# Patient Record
Sex: FEMALE | Race: WHITE | NOT HISPANIC OR LATINO | Employment: PART TIME | URBAN - METROPOLITAN AREA
[De-identification: names, ages, dates, MRNs, and addresses within clinical notes are randomized per-mention and may not be internally consistent; named-entity substitution may affect disease eponyms.]

---

## 2023-02-23 LAB
ALANINE AMINOTRANSFERASE (SGPT) (U/L) IN SER/PLAS: 27 U/L (ref 7–45)
ALBUMIN (G/DL) IN SER/PLAS: 4.3 G/DL (ref 3.4–5)
ALKALINE PHOSPHATASE (U/L) IN SER/PLAS: 83 U/L (ref 33–136)
ANION GAP IN SER/PLAS: 12 MMOL/L (ref 10–20)
ASPARTATE AMINOTRANSFERASE (SGOT) (U/L) IN SER/PLAS: 19 U/L (ref 9–39)
BASOPHILS (10*3/UL) IN BLOOD BY AUTOMATED COUNT: 0.09 X10E9/L (ref 0–0.1)
BASOPHILS/100 LEUKOCYTES IN BLOOD BY AUTOMATED COUNT: 0.9 % (ref 0–2)
BILIRUBIN TOTAL (MG/DL) IN SER/PLAS: 0.2 MG/DL (ref 0–1.2)
CALCIUM (MG/DL) IN SER/PLAS: 9.3 MG/DL (ref 8.6–10.3)
CARBON DIOXIDE, TOTAL (MMOL/L) IN SER/PLAS: 27 MMOL/L (ref 21–32)
CHLORIDE (MMOL/L) IN SER/PLAS: 105 MMOL/L (ref 98–107)
CHOLESTEROL (MG/DL) IN SER/PLAS: 94 MG/DL (ref 0–199)
CHOLESTEROL IN HDL (MG/DL) IN SER/PLAS: 35.4 MG/DL
CHOLESTEROL/HDL RATIO: 2.7
CREATININE (MG/DL) IN SER/PLAS: 0.92 MG/DL (ref 0.5–1.05)
EOSINOPHILS (10*3/UL) IN BLOOD BY AUTOMATED COUNT: 0.27 X10E9/L (ref 0–0.7)
EOSINOPHILS/100 LEUKOCYTES IN BLOOD BY AUTOMATED COUNT: 2.8 % (ref 0–6)
ERYTHROCYTE DISTRIBUTION WIDTH (RATIO) BY AUTOMATED COUNT: 13.6 % (ref 11.5–14.5)
ERYTHROCYTE MEAN CORPUSCULAR HEMOGLOBIN CONCENTRATION (G/DL) BY AUTOMATED: 31.5 G/DL (ref 32–36)
ERYTHROCYTE MEAN CORPUSCULAR VOLUME (FL) BY AUTOMATED COUNT: 89 FL (ref 80–100)
ERYTHROCYTES (10*6/UL) IN BLOOD BY AUTOMATED COUNT: 4.49 X10E12/L (ref 4–5.2)
ESTIMATED AVERAGE GLUCOSE FOR HBA1C: 160 MG/DL
GFR FEMALE: 67 ML/MIN/1.73M2
GLUCOSE (MG/DL) IN SER/PLAS: 135 MG/DL (ref 74–99)
HEMATOCRIT (%) IN BLOOD BY AUTOMATED COUNT: 40 % (ref 36–46)
HEMOGLOBIN (G/DL) IN BLOOD: 12.6 G/DL (ref 12–16)
HEMOGLOBIN A1C/HEMOGLOBIN TOTAL IN BLOOD: 7.2 %
IMMATURE GRANULOCYTES/100 LEUKOCYTES IN BLOOD BY AUTOMATED COUNT: 0.3 % (ref 0–0.9)
LDL: 21 MG/DL (ref 0–99)
LEUKOCYTES (10*3/UL) IN BLOOD BY AUTOMATED COUNT: 9.7 X10E9/L (ref 4.4–11.3)
LYMPHOCYTES (10*3/UL) IN BLOOD BY AUTOMATED COUNT: 3.97 X10E9/L (ref 1.2–4.8)
LYMPHOCYTES/100 LEUKOCYTES IN BLOOD BY AUTOMATED COUNT: 40.8 % (ref 13–44)
MONOCYTES (10*3/UL) IN BLOOD BY AUTOMATED COUNT: 0.66 X10E9/L (ref 0.1–1)
MONOCYTES/100 LEUKOCYTES IN BLOOD BY AUTOMATED COUNT: 6.8 % (ref 2–10)
NEUTROPHILS (10*3/UL) IN BLOOD BY AUTOMATED COUNT: 4.71 X10E9/L (ref 1.2–7.7)
NEUTROPHILS/100 LEUKOCYTES IN BLOOD BY AUTOMATED COUNT: 48.4 % (ref 40–80)
PLATELETS (10*3/UL) IN BLOOD AUTOMATED COUNT: 271 X10E9/L (ref 150–450)
POTASSIUM (MMOL/L) IN SER/PLAS: 4.7 MMOL/L (ref 3.5–5.3)
PROTEIN TOTAL: 6.7 G/DL (ref 6.4–8.2)
SODIUM (MMOL/L) IN SER/PLAS: 139 MMOL/L (ref 136–145)
TRIGLYCERIDE (MG/DL) IN SER/PLAS: 187 MG/DL (ref 0–149)
UREA NITROGEN (MG/DL) IN SER/PLAS: 19 MG/DL (ref 6–23)
VLDL: 37 MG/DL (ref 0–40)

## 2023-05-04 DIAGNOSIS — I10 PRIMARY HYPERTENSION: Primary | ICD-10-CM

## 2023-05-04 DIAGNOSIS — E11.65 TYPE 2 DIABETES MELLITUS WITH HYPERGLYCEMIA, WITHOUT LONG-TERM CURRENT USE OF INSULIN (MULTI): ICD-10-CM

## 2023-05-04 RX ORDER — LISINOPRIL 10 MG/1
TABLET ORAL
Qty: 100 TABLET | Refills: 2 | Status: SHIPPED | OUTPATIENT
Start: 2023-05-04 | End: 2023-08-21 | Stop reason: SDUPTHER

## 2023-05-04 RX ORDER — METOPROLOL SUCCINATE 50 MG/1
TABLET, EXTENDED RELEASE ORAL
Qty: 100 TABLET | Refills: 2 | Status: SHIPPED | OUTPATIENT
Start: 2023-05-04 | End: 2023-08-21 | Stop reason: SDUPTHER

## 2023-05-04 RX ORDER — METFORMIN HYDROCHLORIDE 1000 MG/1
TABLET ORAL
Qty: 200 TABLET | Refills: 2 | Status: SHIPPED | OUTPATIENT
Start: 2023-05-04 | End: 2023-08-21 | Stop reason: SDUPTHER

## 2023-05-11 DIAGNOSIS — E78.2 COMBINED HYPERLIPIDEMIA: Primary | ICD-10-CM

## 2023-05-12 RX ORDER — ROSUVASTATIN CALCIUM 10 MG/1
TABLET, COATED ORAL
Qty: 100 TABLET | Refills: 2 | Status: SHIPPED | OUTPATIENT
Start: 2023-05-12 | End: 2024-02-26 | Stop reason: SDUPTHER

## 2023-07-26 DIAGNOSIS — F32.0 DEPRESSION, MAJOR, SINGLE EPISODE, MILD (CMS-HCC): Primary | ICD-10-CM

## 2023-07-26 PROBLEM — F17.200 NICOTINE DEPENDENCE: Status: ACTIVE | Noted: 2023-07-26

## 2023-07-26 PROBLEM — E78.5 HYPERLIPIDEMIA: Status: ACTIVE | Noted: 2023-07-26

## 2023-07-26 PROBLEM — E11.9 TYPE 2 DIABETES MELLITUS (MULTI): Status: ACTIVE | Noted: 2023-07-26

## 2023-07-26 PROBLEM — I10 BENIGN ESSENTIAL HYPERTENSION: Status: ACTIVE | Noted: 2023-07-26

## 2023-07-26 RX ORDER — ESCITALOPRAM OXALATE 10 MG/1
10 TABLET ORAL DAILY
Qty: 90 TABLET | Refills: 3 | Status: SHIPPED | OUTPATIENT
Start: 2023-07-26 | End: 2024-05-20

## 2023-07-26 RX ORDER — ESCITALOPRAM OXALATE 10 MG/1
1 TABLET ORAL DAILY
COMMUNITY
Start: 2014-12-09 | End: 2023-08-21 | Stop reason: SDUPTHER

## 2023-07-26 RX ORDER — BLOOD SUGAR DIAGNOSTIC
STRIP MISCELLANEOUS
COMMUNITY
Start: 2020-05-01 | End: 2023-08-02

## 2023-08-02 DIAGNOSIS — E11.65 TYPE 2 DIABETES MELLITUS WITH HYPERGLYCEMIA, WITH LONG-TERM CURRENT USE OF INSULIN (MULTI): Primary | ICD-10-CM

## 2023-08-02 DIAGNOSIS — Z79.4 TYPE 2 DIABETES MELLITUS WITH HYPERGLYCEMIA, WITH LONG-TERM CURRENT USE OF INSULIN (MULTI): Primary | ICD-10-CM

## 2023-08-02 RX ORDER — BLOOD SUGAR DIAGNOSTIC
STRIP MISCELLANEOUS
Qty: 100 STRIP | Refills: 2 | Status: SHIPPED | OUTPATIENT
Start: 2023-08-02

## 2023-08-21 ENCOUNTER — OFFICE VISIT (OUTPATIENT)
Dept: PRIMARY CARE | Facility: CLINIC | Age: 73
End: 2023-08-21
Payer: MEDICARE

## 2023-08-21 VITALS
HEIGHT: 61 IN | BODY MASS INDEX: 34.17 KG/M2 | TEMPERATURE: 98 F | WEIGHT: 181 LBS | DIASTOLIC BLOOD PRESSURE: 62 MMHG | SYSTOLIC BLOOD PRESSURE: 120 MMHG

## 2023-08-21 DIAGNOSIS — F32.0 DEPRESSION, MAJOR, SINGLE EPISODE, MILD (CMS-HCC): ICD-10-CM

## 2023-08-21 DIAGNOSIS — E78.2 MIXED HYPERLIPIDEMIA: ICD-10-CM

## 2023-08-21 DIAGNOSIS — E11.9 TYPE 2 DIABETES MELLITUS WITHOUT COMPLICATION, UNSPECIFIED WHETHER LONG TERM INSULIN USE (MULTI): Primary | ICD-10-CM

## 2023-08-21 DIAGNOSIS — I10 PRIMARY HYPERTENSION: ICD-10-CM

## 2023-08-21 DIAGNOSIS — E11.65 TYPE 2 DIABETES MELLITUS WITH HYPERGLYCEMIA, WITHOUT LONG-TERM CURRENT USE OF INSULIN (MULTI): ICD-10-CM

## 2023-08-21 LAB — POC HEMOGLOBIN A1C: 6.4 % (ref 4.2–6.5)

## 2023-08-21 PROCEDURE — 3078F DIAST BP <80 MM HG: CPT | Performed by: NURSE PRACTITIONER

## 2023-08-21 PROCEDURE — 3051F HG A1C>EQUAL 7.0%<8.0%: CPT | Performed by: NURSE PRACTITIONER

## 2023-08-21 PROCEDURE — 99214 OFFICE O/P EST MOD 30 MIN: CPT | Performed by: NURSE PRACTITIONER

## 2023-08-21 PROCEDURE — 3074F SYST BP LT 130 MM HG: CPT | Performed by: NURSE PRACTITIONER

## 2023-08-21 PROCEDURE — 83036 HEMOGLOBIN GLYCOSYLATED A1C: CPT | Performed by: NURSE PRACTITIONER

## 2023-08-21 PROCEDURE — 1160F RVW MEDS BY RX/DR IN RCRD: CPT | Performed by: NURSE PRACTITIONER

## 2023-08-21 PROCEDURE — 4010F ACE/ARB THERAPY RXD/TAKEN: CPT | Performed by: NURSE PRACTITIONER

## 2023-08-21 PROCEDURE — 1159F MED LIST DOCD IN RCRD: CPT | Performed by: NURSE PRACTITIONER

## 2023-08-21 RX ORDER — METFORMIN HYDROCHLORIDE 1000 MG/1
1000 TABLET ORAL
Qty: 200 TABLET | Refills: 1 | Status: SHIPPED | OUTPATIENT
Start: 2023-08-21 | End: 2024-02-26 | Stop reason: SDUPTHER

## 2023-08-21 RX ORDER — LISINOPRIL 10 MG/1
10 TABLET ORAL DAILY
Qty: 100 TABLET | Refills: 1 | Status: SHIPPED | OUTPATIENT
Start: 2023-08-21 | End: 2024-02-26 | Stop reason: SDUPTHER

## 2023-08-21 RX ORDER — METOPROLOL SUCCINATE 50 MG/1
50 TABLET, EXTENDED RELEASE ORAL DAILY
Qty: 100 TABLET | Refills: 1 | Status: SHIPPED | OUTPATIENT
Start: 2023-08-21 | End: 2024-02-26 | Stop reason: SDUPTHER

## 2023-08-21 ASSESSMENT — ENCOUNTER SYMPTOMS
CARDIOVASCULAR NEGATIVE: 1
NEUROLOGICAL NEGATIVE: 1
PSYCHIATRIC NEGATIVE: 1
RESPIRATORY NEGATIVE: 1
CONSTITUTIONAL NEGATIVE: 1
MUSCULOSKELETAL NEGATIVE: 1

## 2023-08-21 ASSESSMENT — PATIENT HEALTH QUESTIONNAIRE - PHQ9
SUM OF ALL RESPONSES TO PHQ9 QUESTIONS 1 AND 2: 0
2. FEELING DOWN, DEPRESSED OR HOPELESS: NOT AT ALL
1. LITTLE INTEREST OR PLEASURE IN DOING THINGS: NOT AT ALL

## 2023-08-21 NOTE — PROGRESS NOTES
"Subjective   Patient ID: Aniya Ha is a 72 y.o. female who presents for Follow-up (6 month).    HPI Presents today for follow up and medication renewal.  Tolerates medication well.  Went off statin for two weeks because she was having some muscle aches  Now back on and neck pain went away  Has not come back  Esctipalopram depression under good control  Working two jobs,.  5 days a week at [employer].  3 evening at [employer]  Is more active and feeling well  Blood work 2/2023 reviewed as noted    Review of Systems   Constitutional: Negative.    Respiratory: Negative.     Cardiovascular: Negative.    Musculoskeletal: Negative.    Neurological: Negative.    Psychiatric/Behavioral: Negative.         Objective   /62 (BP Location: Left arm, Patient Position: Sitting)   Temp 36.7 °C (98 °F) (Temporal)   Ht 1.56 m (5' 1.42\")   Wt 82.1 kg (181 lb)   BMI 33.74 kg/m²     Physical Exam  Constitutional:       Appearance: Normal appearance.   Cardiovascular:      Rate and Rhythm: Normal rate and regular rhythm.   Pulmonary:      Effort: Pulmonary effort is normal.      Breath sounds: Normal breath sounds.   Abdominal:      General: Abdomen is flat. Bowel sounds are normal.      Palpations: Abdomen is soft.   Musculoskeletal:         General: Normal range of motion.   Neurological:      General: No focal deficit present.      Mental Status: She is alert.   Psychiatric:         Mood and Affect: Mood normal.         Behavior: Behavior normal.         Assessment/Plan   Problem List Items Addressed This Visit       Depression, major, single episode, mild (CMS/HCC)    Type 2 diabetes mellitus (CMS/HCC)    Relevant Medications    metFORMIN (Glucophage) 1,000 mg tablet    Other Relevant Orders    POCT glycosylated hemoglobin (Hb A1C) manually resulted (Completed)     Other Visit Diagnoses       Primary hypertension        Relevant Medications    metoprolol succinate XL (Toprol-XL) 50 mg 24 hr tablet    lisinopril 10 mg " tablet

## 2023-08-21 NOTE — PATIENT INSTRUCTIONS
Keep up the good work with the exercise.   Medications renewed  Follow up in 6 months and as needed

## 2024-02-21 ENCOUNTER — OFFICE VISIT (OUTPATIENT)
Dept: PRIMARY CARE | Facility: CLINIC | Age: 74
End: 2024-02-21
Payer: MEDICARE

## 2024-02-21 VITALS
HEIGHT: 61 IN | SYSTOLIC BLOOD PRESSURE: 130 MMHG | HEART RATE: 96 BPM | WEIGHT: 180 LBS | TEMPERATURE: 97.6 F | OXYGEN SATURATION: 97 % | BODY MASS INDEX: 33.99 KG/M2 | DIASTOLIC BLOOD PRESSURE: 74 MMHG

## 2024-02-21 DIAGNOSIS — Z00.00 MEDICARE ANNUAL WELLNESS VISIT, SUBSEQUENT: Primary | ICD-10-CM

## 2024-02-21 DIAGNOSIS — E78.2 MIXED HYPERLIPIDEMIA: ICD-10-CM

## 2024-02-21 DIAGNOSIS — I10 BENIGN ESSENTIAL HYPERTENSION: ICD-10-CM

## 2024-02-21 DIAGNOSIS — F17.210 CIGARETTE NICOTINE DEPENDENCE WITHOUT COMPLICATION: ICD-10-CM

## 2024-02-21 DIAGNOSIS — Z00.00 HEALTHCARE MAINTENANCE: ICD-10-CM

## 2024-02-21 DIAGNOSIS — F32.0 DEPRESSION, MAJOR, SINGLE EPISODE, MILD (CMS-HCC): ICD-10-CM

## 2024-02-21 DIAGNOSIS — Z12.11 ENCOUNTER FOR SCREENING FOR MALIGNANT NEOPLASM OF COLON: ICD-10-CM

## 2024-02-21 DIAGNOSIS — E11.65 TYPE 2 DIABETES MELLITUS WITH HYPERGLYCEMIA, WITHOUT LONG-TERM CURRENT USE OF INSULIN (MULTI): ICD-10-CM

## 2024-02-21 PROCEDURE — 4010F ACE/ARB THERAPY RXD/TAKEN: CPT | Performed by: NURSE PRACTITIONER

## 2024-02-21 PROCEDURE — 1160F RVW MEDS BY RX/DR IN RCRD: CPT | Performed by: NURSE PRACTITIONER

## 2024-02-21 PROCEDURE — 1124F ACP DISCUSS-NO DSCNMKR DOCD: CPT | Performed by: NURSE PRACTITIONER

## 2024-02-21 PROCEDURE — 99214 OFFICE O/P EST MOD 30 MIN: CPT | Performed by: NURSE PRACTITIONER

## 2024-02-21 PROCEDURE — 99397 PER PM REEVAL EST PAT 65+ YR: CPT | Performed by: NURSE PRACTITIONER

## 2024-02-21 PROCEDURE — 1170F FXNL STATUS ASSESSED: CPT | Performed by: NURSE PRACTITIONER

## 2024-02-21 PROCEDURE — G0439 PPPS, SUBSEQ VISIT: HCPCS | Performed by: NURSE PRACTITIONER

## 2024-02-21 PROCEDURE — 1159F MED LIST DOCD IN RCRD: CPT | Performed by: NURSE PRACTITIONER

## 2024-02-21 PROCEDURE — 3078F DIAST BP <80 MM HG: CPT | Performed by: NURSE PRACTITIONER

## 2024-02-21 PROCEDURE — 3075F SYST BP GE 130 - 139MM HG: CPT | Performed by: NURSE PRACTITIONER

## 2024-02-21 ASSESSMENT — ACTIVITIES OF DAILY LIVING (ADL)
DOING_HOUSEWORK: INDEPENDENT
TAKING_MEDICATION: INDEPENDENT
DRESSING: INDEPENDENT
BATHING: INDEPENDENT
MANAGING_FINANCES: INDEPENDENT
GROCERY_SHOPPING: INDEPENDENT

## 2024-02-21 ASSESSMENT — ENCOUNTER SYMPTOMS
CARDIOVASCULAR NEGATIVE: 1
CONSTITUTIONAL NEGATIVE: 1
GASTROINTESTINAL NEGATIVE: 1
NEUROLOGICAL NEGATIVE: 1
MUSCULOSKELETAL NEGATIVE: 1
RESPIRATORY NEGATIVE: 1
PSYCHIATRIC NEGATIVE: 1

## 2024-02-21 ASSESSMENT — PATIENT HEALTH QUESTIONNAIRE - PHQ9
1. LITTLE INTEREST OR PLEASURE IN DOING THINGS: NOT AT ALL
SUM OF ALL RESPONSES TO PHQ9 QUESTIONS 1 AND 2: 0
2. FEELING DOWN, DEPRESSED OR HOPELESS: NOT AT ALL

## 2024-02-21 NOTE — PATIENT INSTRUCTIONS
I will follow up with the blood work and refill medications.   Continue tot stay active and limit simple sugars in your diet.   Salma set up living will and health care power of .   Follow up in 6 months and as needed

## 2024-02-21 NOTE — PROGRESS NOTES
"Subjective   Reason for Visit: Aniya Ha is an 73 y.o. female here for a Medicare Wellness visit.     Past Medical, Surgical, and Family History reviewed and updated in chart.    Reviewed all medications by prescribing practitioner or clinical pharmacist (such as prescriptions, OTCs, herbal therapies and supplements) and documented in the medical record.    HPIPresents today for Medicare Wellness, Health Maintenance and follow up  Dental cleaning every 6 months.  Brush twice a day and floss.   Eye exam yearly.  Wears glasses, had cataracts removed with lens implant.  Due for blood work.   Immunizations as noted  Last mammogram 3/2022, normal.  Colgouard 2021 negative.   Presents today for follow up and medication renewal.  Tolerates medication well.  Blood sugars 140-150 in the am  Depression is in good control  No living will or health care power of . Discussed and patient will set up  Patient Care Team:  CHAYO Reyes as PCP - General  CHAYO Reyes as PCP - [employer] PCP     Review of Systems   Constitutional: Negative.    HENT: Negative.     Respiratory: Negative.     Cardiovascular: Negative.    Gastrointestinal: Negative.    Musculoskeletal: Negative.    Neurological: Negative.    Psychiatric/Behavioral: Negative.          As noted in HPI         Objective   Vitals:  /74 (BP Location: Left arm, Patient Position: Sitting)   Pulse 96   Temp 36.4 °C (97.6 °F) (Temporal)   Ht 1.549 m (5' 1\")   Wt 81.6 kg (180 lb)   SpO2 97%   BMI 34.01 kg/m²       Physical Exam  Constitutional:       Appearance: Normal appearance.   HENT:      Right Ear: Tympanic membrane, ear canal and external ear normal.      Left Ear: Tympanic membrane, ear canal and external ear normal.      Nose: Nose normal.      Mouth/Throat:      Mouth: Mucous membranes are moist.      Pharynx: Oropharynx is clear.   Eyes:      Pupils: Pupils are equal, round, and reactive to light. "   Cardiovascular:      Rate and Rhythm: Normal rate and regular rhythm.      Pulses: Normal pulses.   Pulmonary:      Effort: Pulmonary effort is normal.   Abdominal:      General: Abdomen is flat. Bowel sounds are normal.      Palpations: Abdomen is soft.   Musculoskeletal:         General: Normal range of motion.      Cervical back: Normal range of motion.   Lymphadenopathy:      Cervical: No cervical adenopathy.   Skin:     General: Skin is warm.   Neurological:      General: No focal deficit present.      Mental Status: She is alert and oriented to person, place, and time.   Psychiatric:         Mood and Affect: Mood normal.         Behavior: Behavior normal.         Assessment/Plan   Problem List Items Addressed This Visit       Benign essential hypertension    Relevant Orders    Lipid Panel    Comprehensive Metabolic Panel    CBC and Auto Differential    Hemoglobin A1C    Depression, major, single episode, mild (CMS/HCC)    Hyperlipidemia    Relevant Orders    Lipid Panel    Comprehensive Metabolic Panel    CBC and Auto Differential    Hemoglobin A1C    Nicotine dependence    Type 2 diabetes mellitus (CMS/HCC)    Relevant Orders    Hemoglobin A1C     Other Visit Diagnoses       Medicare annual wellness visit, subsequent    -  Primary    Healthcare maintenance        Encounter for screening for malignant neoplasm of colon        Relevant Orders    Cologuard® colon cancer screening

## 2024-02-23 ENCOUNTER — LAB (OUTPATIENT)
Dept: LAB | Facility: LAB | Age: 74
End: 2024-02-23
Payer: MEDICARE

## 2024-02-23 DIAGNOSIS — I10 BENIGN ESSENTIAL HYPERTENSION: ICD-10-CM

## 2024-02-23 DIAGNOSIS — E11.65 TYPE 2 DIABETES MELLITUS WITH HYPERGLYCEMIA, WITHOUT LONG-TERM CURRENT USE OF INSULIN (MULTI): ICD-10-CM

## 2024-02-23 DIAGNOSIS — E78.2 MIXED HYPERLIPIDEMIA: ICD-10-CM

## 2024-02-23 LAB
ALBUMIN SERPL BCP-MCNC: 4.2 G/DL (ref 3.4–5)
ALP SERPL-CCNC: 79 U/L (ref 33–136)
ALT SERPL W P-5'-P-CCNC: 21 U/L (ref 7–45)
ANION GAP SERPL CALC-SCNC: 12 MMOL/L (ref 10–20)
AST SERPL W P-5'-P-CCNC: 15 U/L (ref 9–39)
BASOPHILS # BLD AUTO: 0.08 X10*3/UL (ref 0–0.1)
BASOPHILS NFR BLD AUTO: 0.9 %
BILIRUB SERPL-MCNC: 0.4 MG/DL (ref 0–1.2)
BUN SERPL-MCNC: 22 MG/DL (ref 6–23)
CALCIUM SERPL-MCNC: 9.2 MG/DL (ref 8.6–10.3)
CHLORIDE SERPL-SCNC: 105 MMOL/L (ref 98–107)
CHOLEST SERPL-MCNC: 100 MG/DL (ref 0–199)
CHOLESTEROL/HDL RATIO: 2.7
CO2 SERPL-SCNC: 28 MMOL/L (ref 21–32)
CREAT SERPL-MCNC: 1.01 MG/DL (ref 0.5–1.05)
EGFRCR SERPLBLD CKD-EPI 2021: 60 ML/MIN/1.73M*2
EOSINOPHIL # BLD AUTO: 0.28 X10*3/UL (ref 0–0.4)
EOSINOPHIL NFR BLD AUTO: 3.1 %
ERYTHROCYTE [DISTWIDTH] IN BLOOD BY AUTOMATED COUNT: 13.5 % (ref 11.5–14.5)
GLUCOSE SERPL-MCNC: 148 MG/DL (ref 74–99)
HCT VFR BLD AUTO: 42.4 % (ref 36–46)
HDLC SERPL-MCNC: 36.7 MG/DL
HGB BLD-MCNC: 13.4 G/DL (ref 12–16)
IMM GRANULOCYTES # BLD AUTO: 0.04 X10*3/UL (ref 0–0.5)
IMM GRANULOCYTES NFR BLD AUTO: 0.4 % (ref 0–0.9)
LDLC SERPL CALC-MCNC: 27 MG/DL
LYMPHOCYTES # BLD AUTO: 3.37 X10*3/UL (ref 0.8–3)
LYMPHOCYTES NFR BLD AUTO: 37.2 %
MCH RBC QN AUTO: 28.8 PG (ref 26–34)
MCHC RBC AUTO-ENTMCNC: 31.6 G/DL (ref 32–36)
MCV RBC AUTO: 91 FL (ref 80–100)
MONOCYTES # BLD AUTO: 0.62 X10*3/UL (ref 0.05–0.8)
MONOCYTES NFR BLD AUTO: 6.8 %
NEUTROPHILS # BLD AUTO: 4.67 X10*3/UL (ref 1.6–5.5)
NEUTROPHILS NFR BLD AUTO: 51.6 %
NON HDL CHOLESTEROL: 63 MG/DL (ref 0–149)
NRBC BLD-RTO: 0 /100 WBCS (ref 0–0)
PLATELET # BLD AUTO: 309 X10*3/UL (ref 150–450)
POTASSIUM SERPL-SCNC: 4.6 MMOL/L (ref 3.5–5.3)
PROT SERPL-MCNC: 6.8 G/DL (ref 6.4–8.2)
RBC # BLD AUTO: 4.65 X10*6/UL (ref 4–5.2)
SODIUM SERPL-SCNC: 140 MMOL/L (ref 136–145)
TRIGL SERPL-MCNC: 184 MG/DL (ref 0–149)
VLDL: 37 MG/DL (ref 0–40)
WBC # BLD AUTO: 9.1 X10*3/UL (ref 4.4–11.3)

## 2024-02-23 PROCEDURE — 83036 HEMOGLOBIN GLYCOSYLATED A1C: CPT

## 2024-02-23 PROCEDURE — 85025 COMPLETE CBC W/AUTO DIFF WBC: CPT

## 2024-02-23 PROCEDURE — 80061 LIPID PANEL: CPT

## 2024-02-23 PROCEDURE — 80053 COMPREHEN METABOLIC PANEL: CPT

## 2024-02-24 LAB
EST. AVERAGE GLUCOSE BLD GHB EST-MCNC: 166 MG/DL
HBA1C MFR BLD: 7.4 %

## 2024-02-26 DIAGNOSIS — I10 PRIMARY HYPERTENSION: ICD-10-CM

## 2024-02-26 DIAGNOSIS — E11.65 TYPE 2 DIABETES MELLITUS WITH HYPERGLYCEMIA, WITHOUT LONG-TERM CURRENT USE OF INSULIN (MULTI): ICD-10-CM

## 2024-02-26 DIAGNOSIS — E78.2 COMBINED HYPERLIPIDEMIA: ICD-10-CM

## 2024-02-26 RX ORDER — LISINOPRIL 10 MG/1
10 TABLET ORAL DAILY
Qty: 100 TABLET | Refills: 1 | Status: SHIPPED | OUTPATIENT
Start: 2024-02-26 | End: 2024-05-20

## 2024-02-26 RX ORDER — METFORMIN HYDROCHLORIDE 1000 MG/1
1000 TABLET ORAL
Qty: 200 TABLET | Refills: 1 | Status: SHIPPED | OUTPATIENT
Start: 2024-02-26

## 2024-02-26 RX ORDER — METOPROLOL SUCCINATE 50 MG/1
50 TABLET, EXTENDED RELEASE ORAL DAILY
Qty: 100 TABLET | Refills: 1 | Status: SHIPPED | OUTPATIENT
Start: 2024-02-26 | End: 2024-05-20

## 2024-02-26 RX ORDER — ROSUVASTATIN CALCIUM 10 MG/1
10 TABLET, COATED ORAL NIGHTLY
Qty: 100 TABLET | Refills: 3 | Status: SHIPPED | OUTPATIENT
Start: 2024-02-26

## 2024-03-25 LAB — NONINV COLON CA DNA+OCC BLD SCRN STL QL: NEGATIVE

## 2024-03-27 ENCOUNTER — TELEPHONE (OUTPATIENT)
Dept: PRIMARY CARE | Facility: CLINIC | Age: 74
End: 2024-03-27
Payer: MEDICARE

## 2024-03-27 NOTE — TELEPHONE ENCOUNTER
Per HIPAA ok to leave detailed message, LM on pt voicemail with this information. She should call back if she has any questions or concerns.

## 2024-03-27 NOTE — TELEPHONE ENCOUNTER
----- Message from CHAYO Salazar sent at 3/26/2024  5:43 PM EDT -----  Please let her know her Cologuard was negative.

## 2024-05-19 DIAGNOSIS — F32.0 DEPRESSION, MAJOR, SINGLE EPISODE, MILD (CMS-HCC): ICD-10-CM

## 2024-05-19 DIAGNOSIS — I10 PRIMARY HYPERTENSION: ICD-10-CM

## 2024-05-20 RX ORDER — LISINOPRIL 10 MG/1
10 TABLET ORAL DAILY
Qty: 100 TABLET | Refills: 2 | Status: SHIPPED | OUTPATIENT
Start: 2024-05-20

## 2024-05-20 RX ORDER — ESCITALOPRAM OXALATE 10 MG/1
10 TABLET ORAL DAILY
Qty: 90 TABLET | Refills: 3 | Status: SHIPPED | OUTPATIENT
Start: 2024-05-20

## 2024-05-20 RX ORDER — METOPROLOL SUCCINATE 50 MG/1
50 TABLET, EXTENDED RELEASE ORAL DAILY
Qty: 100 TABLET | Refills: 2 | Status: SHIPPED | OUTPATIENT
Start: 2024-05-20

## 2024-06-19 DIAGNOSIS — E11.65 TYPE 2 DIABETES MELLITUS WITH HYPERGLYCEMIA, WITH LONG-TERM CURRENT USE OF INSULIN (MULTI): ICD-10-CM

## 2024-06-19 DIAGNOSIS — Z79.4 TYPE 2 DIABETES MELLITUS WITH HYPERGLYCEMIA, WITH LONG-TERM CURRENT USE OF INSULIN (MULTI): ICD-10-CM

## 2024-06-19 RX ORDER — BLOOD SUGAR DIAGNOSTIC
STRIP MISCELLANEOUS
Qty: 100 STRIP | Refills: 2 | Status: SHIPPED | OUTPATIENT
Start: 2024-06-19

## 2024-08-21 ENCOUNTER — APPOINTMENT (OUTPATIENT)
Dept: PRIMARY CARE | Facility: CLINIC | Age: 74
End: 2024-08-21
Payer: MEDICARE

## 2024-08-21 VITALS
DIASTOLIC BLOOD PRESSURE: 60 MMHG | WEIGHT: 179.4 LBS | SYSTOLIC BLOOD PRESSURE: 120 MMHG | TEMPERATURE: 97.7 F | BODY MASS INDEX: 33.9 KG/M2

## 2024-08-21 DIAGNOSIS — E78.2 MIXED HYPERLIPIDEMIA: ICD-10-CM

## 2024-08-21 DIAGNOSIS — I10 PRIMARY HYPERTENSION: ICD-10-CM

## 2024-08-21 DIAGNOSIS — F32.0 DEPRESSION, MAJOR, SINGLE EPISODE, MILD (CMS-HCC): ICD-10-CM

## 2024-08-21 DIAGNOSIS — E11.65 TYPE 2 DIABETES MELLITUS WITH HYPERGLYCEMIA, WITHOUT LONG-TERM CURRENT USE OF INSULIN (MULTI): Primary | ICD-10-CM

## 2024-08-21 LAB — POC HEMOGLOBIN A1C: 7 % (ref 4.2–6.5)

## 2024-08-21 PROCEDURE — G2211 COMPLEX E/M VISIT ADD ON: HCPCS | Performed by: NURSE PRACTITIONER

## 2024-08-21 PROCEDURE — 99213 OFFICE O/P EST LOW 20 MIN: CPT | Performed by: NURSE PRACTITIONER

## 2024-08-21 PROCEDURE — 3048F LDL-C <100 MG/DL: CPT | Performed by: NURSE PRACTITIONER

## 2024-08-21 PROCEDURE — 83036 HEMOGLOBIN GLYCOSYLATED A1C: CPT | Performed by: NURSE PRACTITIONER

## 2024-08-21 PROCEDURE — 4010F ACE/ARB THERAPY RXD/TAKEN: CPT | Performed by: NURSE PRACTITIONER

## 2024-08-21 PROCEDURE — 3078F DIAST BP <80 MM HG: CPT | Performed by: NURSE PRACTITIONER

## 2024-08-21 PROCEDURE — 3051F HG A1C>EQUAL 7.0%<8.0%: CPT | Performed by: NURSE PRACTITIONER

## 2024-08-21 PROCEDURE — 3074F SYST BP LT 130 MM HG: CPT | Performed by: NURSE PRACTITIONER

## 2024-08-21 PROCEDURE — 1160F RVW MEDS BY RX/DR IN RCRD: CPT | Performed by: NURSE PRACTITIONER

## 2024-08-21 PROCEDURE — 1124F ACP DISCUSS-NO DSCNMKR DOCD: CPT | Performed by: NURSE PRACTITIONER

## 2024-08-21 PROCEDURE — 1159F MED LIST DOCD IN RCRD: CPT | Performed by: NURSE PRACTITIONER

## 2024-08-21 RX ORDER — LISINOPRIL 10 MG/1
10 TABLET ORAL DAILY
Qty: 100 TABLET | Refills: 2 | Status: SHIPPED | OUTPATIENT
Start: 2024-08-21

## 2024-08-21 RX ORDER — BLOOD SUGAR DIAGNOSTIC
1 STRIP MISCELLANEOUS DAILY
Qty: 100 STRIP | Refills: 3 | Status: SHIPPED | OUTPATIENT
Start: 2024-08-21

## 2024-08-21 RX ORDER — METFORMIN HYDROCHLORIDE 1000 MG/1
1000 TABLET ORAL
Qty: 200 TABLET | Refills: 1 | Status: SHIPPED | OUTPATIENT
Start: 2024-08-21

## 2024-08-21 ASSESSMENT — ENCOUNTER SYMPTOMS
CONSTITUTIONAL NEGATIVE: 1
MUSCULOSKELETAL NEGATIVE: 1
CARDIOVASCULAR NEGATIVE: 1
RESPIRATORY NEGATIVE: 1
PSYCHIATRIC NEGATIVE: 1
NEUROLOGICAL NEGATIVE: 1

## 2024-08-21 ASSESSMENT — PATIENT HEALTH QUESTIONNAIRE - PHQ9
2. FEELING DOWN, DEPRESSED OR HOPELESS: NOT AT ALL
SUM OF ALL RESPONSES TO PHQ9 QUESTIONS 1 AND 2: 0
1. LITTLE INTEREST OR PLEASURE IN DOING THINGS: NOT AT ALL

## 2024-08-21 NOTE — PATIENT INSTRUCTIONS
Medications renewed.   Keep up the good work lowering your blood sugar.   Follow up in 6 months for Medicare Wellness

## 2024-08-21 NOTE — PROGRESS NOTES
Subjective   Patient ID: Aniya Ha is a 73 y.o. female who presents for Follow-up.    HPI Presents today for follow up and medication renewal.  Tolerates medication well.  Blood sugars in the am 130-140  No health care power of  and living will.   Is tryng to quit smoking, is slowly cutting back  Has been swimming this summer.   Blood work 2/2024 reviewed as noted.  Cologuard 3/2024 negative.     Review of Systems   Constitutional: Negative.    Respiratory: Negative.     Cardiovascular: Negative.    Musculoskeletal: Negative.    Neurological: Negative.    Psychiatric/Behavioral: Negative.         Objective   /60   Temp 36.5 °C (97.7 °F)   Wt 81.4 kg (179 lb 6.4 oz)   BMI 33.90 kg/m²     Physical Exam  Constitutional:       Appearance: Normal appearance.   Cardiovascular:      Rate and Rhythm: Normal rate and regular rhythm.      Heart sounds: Murmur heard.      Systolic murmur is present with a grade of 2/6.   Pulmonary:      Effort: Pulmonary effort is normal.      Breath sounds: Normal breath sounds.   Musculoskeletal:         General: Normal range of motion.   Neurological:      General: No focal deficit present.      Mental Status: She is alert.   Psychiatric:         Mood and Affect: Mood normal.         Behavior: Behavior normal.         Assessment/Plan   Problem List Items Addressed This Visit             ICD-10-CM    Type 2 diabetes mellitus (Multi) E11.9    Relevant Medications    metFORMIN (Glucophage) 1,000 mg tablet    blood sugar diagnostic (Accu-Chek Guide test strips) strip    Other Relevant Orders    POCT glycosylated hemoglobin (Hb A1C) manually resulted (Completed)     Other Visit Diagnoses         Codes    Primary hypertension     I10    Relevant Medications    lisinopril 10 mg tablet

## 2025-02-27 ENCOUNTER — APPOINTMENT (OUTPATIENT)
Dept: PRIMARY CARE | Facility: CLINIC | Age: 75
End: 2025-02-27
Payer: MEDICARE

## 2025-02-27 VITALS
BODY MASS INDEX: 32.83 KG/M2 | DIASTOLIC BLOOD PRESSURE: 70 MMHG | SYSTOLIC BLOOD PRESSURE: 130 MMHG | HEART RATE: 71 BPM | TEMPERATURE: 97.5 F | HEIGHT: 62 IN | WEIGHT: 178.4 LBS | OXYGEN SATURATION: 97 %

## 2025-02-27 DIAGNOSIS — F32.0 DEPRESSION, MAJOR, SINGLE EPISODE, MILD (CMS-HCC): ICD-10-CM

## 2025-02-27 DIAGNOSIS — I10 BENIGN ESSENTIAL HYPERTENSION: ICD-10-CM

## 2025-02-27 DIAGNOSIS — E11.39 TYPE 2 DIABETES MELLITUS WITH OTHER DIABETIC OPHTHALMIC COMPLICATION: ICD-10-CM

## 2025-02-27 DIAGNOSIS — F17.210 CIGARETTE NICOTINE DEPENDENCE WITHOUT COMPLICATION: ICD-10-CM

## 2025-02-27 DIAGNOSIS — R07.9 CHEST PAIN, UNSPECIFIED TYPE: ICD-10-CM

## 2025-02-27 DIAGNOSIS — Z00.00 HEALTHCARE MAINTENANCE: ICD-10-CM

## 2025-02-27 DIAGNOSIS — E66.01 MORBID (SEVERE) OBESITY DUE TO EXCESS CALORIES (MULTI): ICD-10-CM

## 2025-02-27 DIAGNOSIS — Z00.00 MEDICARE ANNUAL WELLNESS VISIT, SUBSEQUENT: Primary | ICD-10-CM

## 2025-02-27 DIAGNOSIS — E78.2 MIXED HYPERLIPIDEMIA: ICD-10-CM

## 2025-02-27 DIAGNOSIS — H61.22 IMPACTED CERUMEN OF LEFT EAR: ICD-10-CM

## 2025-02-27 PROCEDURE — 1159F MED LIST DOCD IN RCRD: CPT | Performed by: NURSE PRACTITIONER

## 2025-02-27 PROCEDURE — 1124F ACP DISCUSS-NO DSCNMKR DOCD: CPT | Performed by: NURSE PRACTITIONER

## 2025-02-27 PROCEDURE — 1170F FXNL STATUS ASSESSED: CPT | Performed by: NURSE PRACTITIONER

## 2025-02-27 PROCEDURE — 4010F ACE/ARB THERAPY RXD/TAKEN: CPT | Performed by: NURSE PRACTITIONER

## 2025-02-27 PROCEDURE — G0439 PPPS, SUBSEQ VISIT: HCPCS | Performed by: NURSE PRACTITIONER

## 2025-02-27 PROCEDURE — 1160F RVW MEDS BY RX/DR IN RCRD: CPT | Performed by: NURSE PRACTITIONER

## 2025-02-27 PROCEDURE — 3075F SYST BP GE 130 - 139MM HG: CPT | Performed by: NURSE PRACTITIONER

## 2025-02-27 PROCEDURE — 3078F DIAST BP <80 MM HG: CPT | Performed by: NURSE PRACTITIONER

## 2025-02-27 PROCEDURE — 99214 OFFICE O/P EST MOD 30 MIN: CPT | Performed by: NURSE PRACTITIONER

## 2025-02-27 PROCEDURE — 99397 PER PM REEVAL EST PAT 65+ YR: CPT | Performed by: NURSE PRACTITIONER

## 2025-02-27 PROCEDURE — 3008F BODY MASS INDEX DOCD: CPT | Performed by: NURSE PRACTITIONER

## 2025-02-27 ASSESSMENT — PATIENT HEALTH QUESTIONNAIRE - PHQ9
1. LITTLE INTEREST OR PLEASURE IN DOING THINGS: NOT AT ALL
2. FEELING DOWN, DEPRESSED OR HOPELESS: NOT AT ALL
SUM OF ALL RESPONSES TO PHQ9 QUESTIONS 1 AND 2: 0

## 2025-02-27 ASSESSMENT — ACTIVITIES OF DAILY LIVING (ADL)
DRESSING: INDEPENDENT
MANAGING_FINANCES: INDEPENDENT
BATHING: INDEPENDENT
GROCERY_SHOPPING: INDEPENDENT
TAKING_MEDICATION: INDEPENDENT
DOING_HOUSEWORK: INDEPENDENT

## 2025-02-27 ASSESSMENT — ENCOUNTER SYMPTOMS
DEPRESSION: 0
OCCASIONAL FEELINGS OF UNSTEADINESS: 0
LOSS OF SENSATION IN FEET: 0

## 2025-02-27 NOTE — PATIENT INSTRUCTIONS
I will follow up witht he blood work and refill edication.   Please use Debrox for the ear wax. Use at night for 3 might and then flush with water.   I will follow up with the stress test.   I have referred you to the pharmacist to help with smoking cessation.   Please get the RSV vaccine.   Follow up in 6 months with Corinna Rowe for transfer of care because of my jail.   It has been a pleasure providing you your health care.

## 2025-02-27 NOTE — PROGRESS NOTES
"Subjective   Reason for Visit: Aniya Ha is an 74 y.o. female here for a Medicare Wellness visit.     Past Medical, Surgical, and Family History reviewed and updated in chart.         HPIPresent for medicare wellness,health maintenance and follow up  Dental exam every 6 months.  Brushes and flosses  Eye exam up to date, done yearly, wears glasses  No sklin concerns  Decline mammogram screening.    Decline flu shot.  Immunizations as noted  Cologuard 3/2024 negative  Blood work 2/2024 and 8/2024 reviewed as noted  Tolerates medication well.  She is requesting help to quit smoking.   She was sitting at her desk the other day and had a squeezing sensation across her chest and down both arms.  Lasted for about 30 second but she was unsettled after that  Has decreased hearing in the left ear.   Patient Care Team:  SCOOTER Reyes-CNP as PCP - General  Milo Craven MD as PCP - [employer] PCP     Review of Systems   Constitutional: Negative.    HENT: Negative.          As noted in HPI     Eyes: Negative.    Respiratory: Negative.     Cardiovascular: Negative.    Gastrointestinal: Negative.    Endocrine: Negative.    Genitourinary: Negative.    Musculoskeletal: Negative.    Skin: Negative.    Neurological: Negative.    Hematological: Negative.    Psychiatric/Behavioral: Negative.         Objective   Vitals:  /70 (BP Location: Left arm, Patient Position: Sitting, BP Cuff Size: Adult)   Pulse 71   Temp 36.4 °C (97.5 °F) (Skin)   Ht 1.562 m (5' 1.5\")   Wt 80.9 kg (178 lb 6.4 oz)   SpO2 97%   BMI 33.16 kg/m²       Physical Exam  Constitutional:       Appearance: Normal appearance.   HENT:      Right Ear: Tympanic membrane, ear canal and external ear normal.      Left Ear: External ear normal. There is impacted cerumen.      Nose: Nose normal.      Mouth/Throat:      Mouth: Mucous membranes are moist.      Pharynx: Oropharynx is clear.   Eyes:      Pupils: Pupils are equal, round, and " reactive to light.   Cardiovascular:      Rate and Rhythm: Normal rate and regular rhythm.      Pulses: Normal pulses.      Heart sounds: Normal heart sounds.   Pulmonary:      Effort: Pulmonary effort is normal.      Breath sounds: Normal breath sounds.   Abdominal:      General: Abdomen is flat. Bowel sounds are normal.      Palpations: Abdomen is soft.   Musculoskeletal:         General: Normal range of motion.      Cervical back: Normal range of motion.   Lymphadenopathy:      Cervical: No cervical adenopathy.   Skin:     General: Skin is warm.   Neurological:      General: No focal deficit present.      Mental Status: She is alert and oriented to person, place, and time.   Psychiatric:         Mood and Affect: Mood normal.         Behavior: Behavior normal.         Assessment & Plan  Morbid (severe) obesity due to excess calories (Multi)         Type 2 diabetes mellitus with other diabetic ophthalmic complication    Orders:    Hemoglobin A1C; Future    Echocardiogram Stress Test; Future    Medicare annual wellness visit, subsequent         Healthcare maintenance         Benign essential hypertension    Orders:    CBC and Auto Differential; Future    Comprehensive Metabolic Panel; Future    Lipid Panel; Future    Echocardiogram Stress Test; Future    Cigarette nicotine dependence without complication    Orders:    Echocardiogram Stress Test; Future    Referral to Clinical Pharmacy; Future    Mixed hyperlipidemia    Orders:    CBC and Auto Differential; Future    Comprehensive Metabolic Panel; Future    Lipid Panel; Future    Echocardiogram Stress Test; Future    Depression, major, single episode, mild (CMS-HCC)         Chest pain, unspecified type    Orders:    Echocardiogram Stress Test; Future    Impacted cerumen of left ear

## 2025-02-28 ASSESSMENT — ENCOUNTER SYMPTOMS
HEMATOLOGIC/LYMPHATIC NEGATIVE: 1
CARDIOVASCULAR NEGATIVE: 1
RESPIRATORY NEGATIVE: 1
EYES NEGATIVE: 1
MUSCULOSKELETAL NEGATIVE: 1
GASTROINTESTINAL NEGATIVE: 1
ENDOCRINE NEGATIVE: 1
CONSTITUTIONAL NEGATIVE: 1
NEUROLOGICAL NEGATIVE: 1
PSYCHIATRIC NEGATIVE: 1

## 2025-02-28 NOTE — ASSESSMENT & PLAN NOTE
Orders:    CBC and Auto Differential; Future    Comprehensive Metabolic Panel; Future    Lipid Panel; Future    Echocardiogram Stress Test; Future

## 2025-03-01 LAB
ALBUMIN SERPL-MCNC: 4.5 G/DL (ref 3.6–5.1)
ALP SERPL-CCNC: 80 U/L (ref 37–153)
ALT SERPL-CCNC: 24 U/L (ref 6–29)
ANION GAP SERPL CALCULATED.4IONS-SCNC: 8 MMOL/L (CALC) (ref 7–17)
AST SERPL-CCNC: 19 U/L (ref 10–35)
BASOPHILS # BLD AUTO: 69 CELLS/UL (ref 0–200)
BASOPHILS NFR BLD AUTO: 0.8 %
BILIRUB SERPL-MCNC: 0.5 MG/DL (ref 0.2–1.2)
BUN SERPL-MCNC: 20 MG/DL (ref 7–25)
CALCIUM SERPL-MCNC: 9.7 MG/DL (ref 8.6–10.4)
CHLORIDE SERPL-SCNC: 103 MMOL/L (ref 98–110)
CHOLEST SERPL-MCNC: 97 MG/DL
CHOLEST/HDLC SERPL: 2.5 (CALC)
CO2 SERPL-SCNC: 26 MMOL/L (ref 20–32)
CREAT SERPL-MCNC: 0.97 MG/DL (ref 0.6–1)
EGFRCR SERPLBLD CKD-EPI 2021: 62 ML/MIN/1.73M2
EOSINOPHIL # BLD AUTO: 292 CELLS/UL (ref 15–500)
EOSINOPHIL NFR BLD AUTO: 3.4 %
ERYTHROCYTE [DISTWIDTH] IN BLOOD BY AUTOMATED COUNT: 12.8 % (ref 11–15)
EST. AVERAGE GLUCOSE BLD GHB EST-MCNC: 154 MG/DL
EST. AVERAGE GLUCOSE BLD GHB EST-SCNC: 8.5 MMOL/L
GLUCOSE SERPL-MCNC: 171 MG/DL (ref 65–99)
HBA1C MFR BLD: 7 % OF TOTAL HGB
HCT VFR BLD AUTO: 40.4 % (ref 35–45)
HDLC SERPL-MCNC: 39 MG/DL
HGB BLD-MCNC: 13.3 G/DL (ref 11.7–15.5)
LDLC SERPL CALC-MCNC: 33 MG/DL (CALC)
LYMPHOCYTES # BLD AUTO: 3062 CELLS/UL (ref 850–3900)
LYMPHOCYTES NFR BLD AUTO: 35.6 %
MCH RBC QN AUTO: 29.1 PG (ref 27–33)
MCHC RBC AUTO-ENTMCNC: 32.9 G/DL (ref 32–36)
MCV RBC AUTO: 88.4 FL (ref 80–100)
MONOCYTES # BLD AUTO: 585 CELLS/UL (ref 200–950)
MONOCYTES NFR BLD AUTO: 6.8 %
NEUTROPHILS # BLD AUTO: 4592 CELLS/UL (ref 1500–7800)
NEUTROPHILS NFR BLD AUTO: 53.4 %
NONHDLC SERPL-MCNC: 58 MG/DL (CALC)
PLATELET # BLD AUTO: 300 THOUSAND/UL (ref 140–400)
PMV BLD REES-ECKER: 8.8 FL (ref 7.5–12.5)
POTASSIUM SERPL-SCNC: 5.3 MMOL/L (ref 3.5–5.3)
PROT SERPL-MCNC: 7.2 G/DL (ref 6.1–8.1)
RBC # BLD AUTO: 4.57 MILLION/UL (ref 3.8–5.1)
SODIUM SERPL-SCNC: 137 MMOL/L (ref 135–146)
TRIGL SERPL-MCNC: 170 MG/DL
WBC # BLD AUTO: 8.6 THOUSAND/UL (ref 3.8–10.8)

## 2025-03-03 DIAGNOSIS — I10 PRIMARY HYPERTENSION: ICD-10-CM

## 2025-03-03 DIAGNOSIS — F32.0 DEPRESSION, MAJOR, SINGLE EPISODE, MILD (CMS-HCC): ICD-10-CM

## 2025-03-03 DIAGNOSIS — E11.65 TYPE 2 DIABETES MELLITUS WITH HYPERGLYCEMIA, WITHOUT LONG-TERM CURRENT USE OF INSULIN: ICD-10-CM

## 2025-03-03 DIAGNOSIS — E78.2 COMBINED HYPERLIPIDEMIA: ICD-10-CM

## 2025-03-03 RX ORDER — METOPROLOL SUCCINATE 50 MG/1
50 TABLET, EXTENDED RELEASE ORAL DAILY
Qty: 100 TABLET | Refills: 2 | Status: SHIPPED | OUTPATIENT
Start: 2025-03-03

## 2025-03-03 RX ORDER — ROSUVASTATIN CALCIUM 10 MG/1
10 TABLET, COATED ORAL NIGHTLY
Qty: 100 TABLET | Refills: 3 | Status: SHIPPED | OUTPATIENT
Start: 2025-03-03

## 2025-03-03 RX ORDER — ESCITALOPRAM OXALATE 10 MG/1
10 TABLET ORAL DAILY
Qty: 90 TABLET | Refills: 3 | Status: SHIPPED | OUTPATIENT
Start: 2025-03-03

## 2025-03-03 RX ORDER — LISINOPRIL 10 MG/1
10 TABLET ORAL DAILY
Qty: 100 TABLET | Refills: 2 | Status: SHIPPED | OUTPATIENT
Start: 2025-03-03

## 2025-03-03 RX ORDER — METFORMIN HYDROCHLORIDE 1000 MG/1
1000 TABLET ORAL
Qty: 200 TABLET | Refills: 1 | Status: SHIPPED | OUTPATIENT
Start: 2025-03-03

## 2025-08-05 DIAGNOSIS — Z12.31 ENCOUNTER FOR SCREENING MAMMOGRAM FOR BREAST CANCER: ICD-10-CM

## 2025-08-29 ENCOUNTER — APPOINTMENT (OUTPATIENT)
Dept: PRIMARY CARE | Facility: CLINIC | Age: 75
End: 2025-08-29
Payer: MEDICARE

## 2025-08-29 VITALS
TEMPERATURE: 97.7 F | WEIGHT: 173 LBS | DIASTOLIC BLOOD PRESSURE: 64 MMHG | OXYGEN SATURATION: 96 % | BODY MASS INDEX: 32.16 KG/M2 | SYSTOLIC BLOOD PRESSURE: 112 MMHG | HEART RATE: 80 BPM

## 2025-08-29 DIAGNOSIS — R07.89 CHEST TIGHTNESS: ICD-10-CM

## 2025-08-29 DIAGNOSIS — Z12.31 ENCOUNTER FOR SCREENING MAMMOGRAM FOR BREAST CANCER: ICD-10-CM

## 2025-08-29 DIAGNOSIS — E11.39 TYPE 2 DIABETES MELLITUS WITH OTHER DIABETIC OPHTHALMIC COMPLICATION: Primary | ICD-10-CM

## 2025-08-29 DIAGNOSIS — F17.210 CIGARETTE NICOTINE DEPENDENCE WITHOUT COMPLICATION: ICD-10-CM

## 2025-08-29 DIAGNOSIS — R01.1 HEART MURMUR: ICD-10-CM

## 2025-08-29 DIAGNOSIS — E78.2 MIXED HYPERLIPIDEMIA: ICD-10-CM

## 2025-08-29 DIAGNOSIS — I10 BENIGN ESSENTIAL HYPERTENSION: ICD-10-CM

## 2025-08-29 DIAGNOSIS — F32.0 DEPRESSION, MAJOR, SINGLE EPISODE, MILD: ICD-10-CM

## 2025-08-29 ASSESSMENT — ENCOUNTER SYMPTOMS
NUMBNESS: 0
CHILLS: 0
COUGH: 0
DIARRHEA: 0
FATIGUE: 0
VOMITING: 0
FEVER: 0
ABDOMINAL PAIN: 0
WEAKNESS: 0
DIZZINESS: 0
SHORTNESS OF BREATH: 0
NAUSEA: 0
HEADACHES: 0
CHEST TIGHTNESS: 0

## 2025-08-29 ASSESSMENT — PATIENT HEALTH QUESTIONNAIRE - PHQ9
SUM OF ALL RESPONSES TO PHQ9 QUESTIONS 1 AND 2: 0
1. LITTLE INTEREST OR PLEASURE IN DOING THINGS: NOT AT ALL
2. FEELING DOWN, DEPRESSED OR HOPELESS: NOT AT ALL

## 2025-08-30 PROBLEM — R01.1 HEART MURMUR: Status: ACTIVE | Noted: 2025-08-30

## 2025-08-30 PROBLEM — R07.89 CHEST TIGHTNESS: Status: ACTIVE | Noted: 2025-08-30

## 2025-08-30 LAB
ALBUMIN/CREAT UR: 5 MG/G CREAT
ANION GAP SERPL CALCULATED.4IONS-SCNC: 13 MMOL/L (CALC) (ref 7–17)
BUN SERPL-MCNC: 20 MG/DL (ref 7–25)
BUN/CREAT SERPL: NORMAL (CALC) (ref 6–22)
CALCIUM SERPL-MCNC: 9.5 MG/DL (ref 8.6–10.4)
CHLORIDE SERPL-SCNC: 103 MMOL/L (ref 98–110)
CO2 SERPL-SCNC: 22 MMOL/L (ref 20–32)
CREAT SERPL-MCNC: 0.94 MG/DL (ref 0.6–1)
CREAT UR-MCNC: 121 MG/DL (ref 20–275)
EGFRCR SERPLBLD CKD-EPI 2021: 64 ML/MIN/1.73M2
EST. AVERAGE GLUCOSE BLD GHB EST-MCNC: 131 MG/DL
EST. AVERAGE GLUCOSE BLD GHB EST-SCNC: 7.3 MMOL/L
GLUCOSE SERPL-MCNC: 90 MG/DL (ref 65–139)
HBA1C MFR BLD: 6.2 %
MICROALBUMIN UR-MCNC: 0.6 MG/DL
POTASSIUM SERPL-SCNC: 4.7 MMOL/L (ref 3.5–5.3)
SODIUM SERPL-SCNC: 138 MMOL/L (ref 135–146)

## 2025-08-30 ASSESSMENT — ENCOUNTER SYMPTOMS: PALPITATIONS: 1

## 2025-09-02 ENCOUNTER — APPOINTMENT (OUTPATIENT)
Dept: RADIOLOGY | Facility: CLINIC | Age: 75
End: 2025-09-02
Payer: MEDICARE

## 2026-02-24 ENCOUNTER — APPOINTMENT (OUTPATIENT)
Dept: PRIMARY CARE | Facility: CLINIC | Age: 76
End: 2026-02-24
Payer: MEDICARE